# Patient Record
Sex: MALE | Race: ASIAN | NOT HISPANIC OR LATINO | URBAN - METROPOLITAN AREA
[De-identification: names, ages, dates, MRNs, and addresses within clinical notes are randomized per-mention and may not be internally consistent; named-entity substitution may affect disease eponyms.]

---

## 2020-12-20 ENCOUNTER — EMERGENCY (EMERGENCY)
Facility: HOSPITAL | Age: 60
LOS: 1 days | Discharge: HOME | End: 2020-12-20
Attending: EMERGENCY MEDICINE
Payer: MEDICAID

## 2020-12-20 VITALS
OXYGEN SATURATION: 97 % | WEIGHT: 169.76 LBS | HEART RATE: 99 BPM | SYSTOLIC BLOOD PRESSURE: 152 MMHG | RESPIRATION RATE: 18 BRPM | TEMPERATURE: 99 F | DIASTOLIC BLOOD PRESSURE: 94 MMHG | HEIGHT: 72 IN

## 2020-12-20 VITALS
OXYGEN SATURATION: 96 % | RESPIRATION RATE: 18 BRPM | HEART RATE: 56 BPM | DIASTOLIC BLOOD PRESSURE: 90 MMHG | SYSTOLIC BLOOD PRESSURE: 122 MMHG

## 2020-12-20 LAB
ALBUMIN SERPL ELPH-MCNC: 4.2 G/DL — SIGNIFICANT CHANGE UP (ref 3.3–5)
ALP SERPL-CCNC: 80 U/L — SIGNIFICANT CHANGE UP (ref 40–120)
ALT FLD-CCNC: 30 U/L — SIGNIFICANT CHANGE UP (ref 10–45)
ANION GAP SERPL CALC-SCNC: 10 MMOL/L — SIGNIFICANT CHANGE UP (ref 5–17)
ANION GAP SERPL CALC-SCNC: 14 MMOL/L — SIGNIFICANT CHANGE UP (ref 5–17)
APPEARANCE UR: CLEAR — SIGNIFICANT CHANGE UP
AST SERPL-CCNC: 35 U/L — SIGNIFICANT CHANGE UP (ref 10–40)
BASE EXCESS BLDV CALC-SCNC: -0.6 MMOL/L — SIGNIFICANT CHANGE UP (ref -2–2)
BILIRUB SERPL-MCNC: 0.2 MG/DL — SIGNIFICANT CHANGE UP (ref 0.2–1.2)
BILIRUB UR-MCNC: NEGATIVE — SIGNIFICANT CHANGE UP
BUN SERPL-MCNC: 15 MG/DL — SIGNIFICANT CHANGE UP (ref 7–23)
BUN SERPL-MCNC: 16 MG/DL — SIGNIFICANT CHANGE UP (ref 7–23)
CA-I SERPL-SCNC: 1.2 MMOL/L — SIGNIFICANT CHANGE UP (ref 1.12–1.3)
CALCIUM SERPL-MCNC: 9.1 MG/DL — SIGNIFICANT CHANGE UP (ref 8.4–10.5)
CALCIUM SERPL-MCNC: 9.8 MG/DL — SIGNIFICANT CHANGE UP (ref 8.4–10.5)
CHLORIDE BLDV-SCNC: 112 MMOL/L — HIGH (ref 96–108)
CHLORIDE SERPL-SCNC: 105 MMOL/L — SIGNIFICANT CHANGE UP (ref 96–108)
CHLORIDE SERPL-SCNC: 108 MMOL/L — SIGNIFICANT CHANGE UP (ref 96–108)
CK SERPL-CCNC: 157 U/L — SIGNIFICANT CHANGE UP (ref 30–200)
CO2 BLDV-SCNC: 26 MMOL/L — SIGNIFICANT CHANGE UP (ref 22–30)
CO2 SERPL-SCNC: 18 MMOL/L — LOW (ref 22–31)
CO2 SERPL-SCNC: 22 MMOL/L — SIGNIFICANT CHANGE UP (ref 22–31)
COLOR SPEC: SIGNIFICANT CHANGE UP
CREAT SERPL-MCNC: 0.75 MG/DL — SIGNIFICANT CHANGE UP (ref 0.5–1.3)
CREAT SERPL-MCNC: 0.77 MG/DL — SIGNIFICANT CHANGE UP (ref 0.5–1.3)
CRP SERPL-MCNC: 0.12 MG/DL — SIGNIFICANT CHANGE UP (ref 0–0.4)
DIFF PNL FLD: NEGATIVE — SIGNIFICANT CHANGE UP
GAS PNL BLDV: 136 MMOL/L — SIGNIFICANT CHANGE UP (ref 135–145)
GAS PNL BLDV: SIGNIFICANT CHANGE UP
GAS PNL BLDV: SIGNIFICANT CHANGE UP
GLUCOSE BLDV-MCNC: 94 MG/DL — SIGNIFICANT CHANGE UP (ref 70–99)
GLUCOSE SERPL-MCNC: 91 MG/DL — SIGNIFICANT CHANGE UP (ref 70–99)
GLUCOSE SERPL-MCNC: 94 MG/DL — SIGNIFICANT CHANGE UP (ref 70–99)
GLUCOSE UR QL: NEGATIVE — SIGNIFICANT CHANGE UP
HCO3 BLDV-SCNC: 24 MMOL/L — SIGNIFICANT CHANGE UP (ref 21–29)
HCT VFR BLD CALC: 42.5 % — SIGNIFICANT CHANGE UP (ref 39–50)
HCT VFR BLDA CALC: 42 % — SIGNIFICANT CHANGE UP (ref 39–50)
HGB BLD CALC-MCNC: 13.7 G/DL — SIGNIFICANT CHANGE UP (ref 13–17)
HGB BLD-MCNC: 13.8 G/DL — SIGNIFICANT CHANGE UP (ref 13–17)
KETONES UR-MCNC: NEGATIVE — SIGNIFICANT CHANGE UP
LACTATE BLDV-MCNC: 0.9 MMOL/L — SIGNIFICANT CHANGE UP (ref 0.7–2)
LEUKOCYTE ESTERASE UR-ACNC: NEGATIVE — SIGNIFICANT CHANGE UP
MCHC RBC-ENTMCNC: 30.2 PG — SIGNIFICANT CHANGE UP (ref 27–34)
MCHC RBC-ENTMCNC: 32.5 GM/DL — SIGNIFICANT CHANGE UP (ref 32–36)
MCV RBC AUTO: 93 FL — SIGNIFICANT CHANGE UP (ref 80–100)
NITRITE UR-MCNC: NEGATIVE — SIGNIFICANT CHANGE UP
NRBC # BLD: 0 /100 WBCS — SIGNIFICANT CHANGE UP (ref 0–0)
OTHER CELLS CSF MANUAL: 13 ML/DL — LOW (ref 18–22)
PCO2 BLDV: 43 MMHG — SIGNIFICANT CHANGE UP (ref 35–50)
PH BLDV: 7.37 — SIGNIFICANT CHANGE UP (ref 7.35–7.45)
PH UR: 6 — SIGNIFICANT CHANGE UP (ref 5–8)
PLATELET # BLD AUTO: 212 K/UL — SIGNIFICANT CHANGE UP (ref 150–400)
PO2 BLDV: 40 MMHG — SIGNIFICANT CHANGE UP (ref 25–45)
POTASSIUM BLDV-SCNC: 4.1 MMOL/L — SIGNIFICANT CHANGE UP (ref 3.5–5.3)
POTASSIUM SERPL-MCNC: 4.1 MMOL/L — SIGNIFICANT CHANGE UP (ref 3.5–5.3)
POTASSIUM SERPL-MCNC: 4.8 MMOL/L — SIGNIFICANT CHANGE UP (ref 3.5–5.3)
POTASSIUM SERPL-SCNC: 4.1 MMOL/L — SIGNIFICANT CHANGE UP (ref 3.5–5.3)
POTASSIUM SERPL-SCNC: 4.8 MMOL/L — SIGNIFICANT CHANGE UP (ref 3.5–5.3)
PROT SERPL-MCNC: 7.2 G/DL — SIGNIFICANT CHANGE UP (ref 6–8.3)
PROT UR-MCNC: NEGATIVE — SIGNIFICANT CHANGE UP
RBC # BLD: 4.57 M/UL — SIGNIFICANT CHANGE UP (ref 4.2–5.8)
RBC # FLD: 14 % — SIGNIFICANT CHANGE UP (ref 10.3–14.5)
SAO2 % BLDV: 70 % — SIGNIFICANT CHANGE UP (ref 67–88)
SODIUM SERPL-SCNC: 137 MMOL/L — SIGNIFICANT CHANGE UP (ref 135–145)
SODIUM SERPL-SCNC: 140 MMOL/L — SIGNIFICANT CHANGE UP (ref 135–145)
SP GR SPEC: 1.02 — SIGNIFICANT CHANGE UP (ref 1.01–1.02)
UROBILINOGEN FLD QL: NEGATIVE — SIGNIFICANT CHANGE UP
WBC # BLD: 5.24 K/UL — SIGNIFICANT CHANGE UP (ref 3.8–10.5)
WBC # FLD AUTO: 5.24 K/UL — SIGNIFICANT CHANGE UP (ref 3.8–10.5)

## 2020-12-20 PROCEDURE — 82947 ASSAY GLUCOSE BLOOD QUANT: CPT

## 2020-12-20 PROCEDURE — 93970 EXTREMITY STUDY: CPT

## 2020-12-20 PROCEDURE — 86140 C-REACTIVE PROTEIN: CPT

## 2020-12-20 PROCEDURE — 85652 RBC SED RATE AUTOMATED: CPT

## 2020-12-20 PROCEDURE — 93970 EXTREMITY STUDY: CPT | Mod: 26

## 2020-12-20 PROCEDURE — 73564 X-RAY EXAM KNEE 4 OR MORE: CPT | Mod: 26,50

## 2020-12-20 PROCEDURE — 80048 BASIC METABOLIC PNL TOTAL CA: CPT

## 2020-12-20 PROCEDURE — 85014 HEMATOCRIT: CPT

## 2020-12-20 PROCEDURE — 99284 EMERGENCY DEPT VISIT MOD MDM: CPT

## 2020-12-20 PROCEDURE — 82330 ASSAY OF CALCIUM: CPT

## 2020-12-20 PROCEDURE — 96374 THER/PROPH/DIAG INJ IV PUSH: CPT

## 2020-12-20 PROCEDURE — 84295 ASSAY OF SERUM SODIUM: CPT

## 2020-12-20 PROCEDURE — 82803 BLOOD GASES ANY COMBINATION: CPT

## 2020-12-20 PROCEDURE — 83605 ASSAY OF LACTIC ACID: CPT

## 2020-12-20 PROCEDURE — 80053 COMPREHEN METABOLIC PANEL: CPT

## 2020-12-20 PROCEDURE — 85018 HEMOGLOBIN: CPT

## 2020-12-20 PROCEDURE — 84132 ASSAY OF SERUM POTASSIUM: CPT

## 2020-12-20 PROCEDURE — 82435 ASSAY OF BLOOD CHLORIDE: CPT

## 2020-12-20 PROCEDURE — 81003 URINALYSIS AUTO W/O SCOPE: CPT

## 2020-12-20 PROCEDURE — 82550 ASSAY OF CK (CPK): CPT

## 2020-12-20 PROCEDURE — 73564 X-RAY EXAM KNEE 4 OR MORE: CPT

## 2020-12-20 PROCEDURE — 99284 EMERGENCY DEPT VISIT MOD MDM: CPT | Mod: 25

## 2020-12-20 PROCEDURE — 85027 COMPLETE CBC AUTOMATED: CPT

## 2020-12-20 RX ORDER — SODIUM CHLORIDE 9 MG/ML
500 INJECTION INTRAMUSCULAR; INTRAVENOUS; SUBCUTANEOUS ONCE
Refills: 0 | Status: COMPLETED | OUTPATIENT
Start: 2020-12-20 | End: 2020-12-20

## 2020-12-20 RX ORDER — KETOROLAC TROMETHAMINE 30 MG/ML
15 SYRINGE (ML) INJECTION ONCE
Refills: 0 | Status: DISCONTINUED | OUTPATIENT
Start: 2020-12-20 | End: 2020-12-20

## 2020-12-20 RX ADMIN — Medication 15 MILLIGRAM(S): at 18:28

## 2020-12-20 RX ADMIN — SODIUM CHLORIDE 500 MILLILITER(S): 9 INJECTION INTRAMUSCULAR; INTRAVENOUS; SUBCUTANEOUS at 16:37

## 2020-12-20 RX ADMIN — Medication 15 MILLIGRAM(S): at 16:36

## 2020-12-20 NOTE — ED PROVIDER NOTE - NSFOLLOWUPCLINICS_GEN_ALL_ED_FT
[facility] Sports Medicine  Sports Medicine  [address] [place] [address]  Phone: [phone]  Fax:   Follow Up Time:

## 2020-12-20 NOTE — ED PROVIDER NOTE - PATIENT PORTAL LINK FT
You can access the FollowMyHealth Patient Portal offered by [facility] by registering at the following website: [email or web address]. By joining [facility]’s FollowMyHealth portal, you will also be able to view your health information using other applications (apps) compatible with our system.

## 2020-12-20 NOTE — ED PROVIDER NOTE - PROGRESS NOTE DETAILS
Lola Almanzar PGY-1 pain has improved. pt able to ambulate with a limp. favoring left side. will reassess after tylenol Lola Almanzar PGY-1 pt able to ambulate. given cane as support. will provide sports med clinic for follow up

## 2020-12-20 NOTE — ED PROVIDER NOTE - NSFOLLOWUPINSTRUCTIONS_ED_ALL_ED_FT
Muscle Strain    WHAT YOU NEED TO KNOW:    What is a muscle strain? A muscle strain is a twist, pull, or tear of a muscle or tendon. A tendon is a strong elastic tissue that connects a muscle to a bone.    What causes a muscle strain? Stretching a muscle too much may cause a muscle strain. A strain may also happen when a muscle is used too much without rest. Leg muscle strains are more common in people who play sports, run, dance, and water-ski. Strains in the muscles of the abdomen may happen when you play volleyball, tennis, golf, or baseball and when you dive. Low back muscle strains may occur when you lift heavy objects or when you wrestle or do gymnastics.    What are the types of muscle strains?   •A mild strain is also called a first-degree strain. It is a tear of a few muscle fibers with little swelling. You may have very little or no loss of muscle strength.      •A moderate strain is also called a second-degree strain. There is more damage to your muscle or tendon, and it is weaker than it was before the injury.       •A severe strain is also called a third-degree strain. This tear goes along the whole length of the muscle, and you are unable to use the muscle at all.       What increases my risk of a muscle strain?   •Older age      •Muscle fatigue (tiredness)      •Not warming up before exercise      •Past muscle injury, or going back to your usual activity before your injury has healed      •Stiff, tight, and weak muscles      •Training longer or farther than your usual time or distance       •Problems with your feet, or your legs being different lengths      What are the signs and symptoms of a muscle strain? The signs and symptoms of a muscle strain depend on how badly your muscle is injured. The signs and symptoms may or may not show up right away when the injury happens. You may have one or more of the following:  •Bruised skin on the area of your injured muscle      •Muscle soreness, cramps, or spasms      •Little or stiff muscle movement, or loss of muscle strength      •Swelling in the area of the injury      •Muscle pain that gets worse with activity, or pain that moves or spreads to another body area      •Crepitus (crackling sound or grating feeling) when you move your muscle      How is a muscle strain diagnosed? Your healthcare provider will ask about diseases or injuries you have had in the past. He may touch and press parts of your muscle. He may bend, stretch, or move your joint certain ways. You may also have any of the following tests:  •X-ray: This is a picture of the bones and tissues in your body. X-rays may be done to make sure that you did not break a bone when your muscle strain happened.       •Magnetic resonance imaging: This test is also called MRI. During the MRI, pictures of your muscles are taken. An MRI may be used to check for tears or other muscle injuries. It may also be used to look at your joints, bones, or blood vessels. You may be given dye through your vein before the pictures are taken. This helps your body parts show up better. Tell your healthcare provider if you are allergic to shellfish (lobster, crab, or shrimp). People who are allergic to shellfish may also be allergic to some dyes.       •Computed tomography scan: This is also called CT scan. A x-ray machine uses a computer to take pictures of your arms, legs, back, or abdomen. It is used to check for muscle injuries, broken bones, and damaged blood vessels.      •Ultrasound: An ultrasound uses sound waves to show pictures of your muscles and tissues on a monitor.      What can I do to help a muscle strain heal?   •3 to 7 days after the injury: Use Rest, Ice, Compression, and Elevation (RICE) to help stop bruising and decrease pain and swelling.?Rest: Rest your muscle to allow your injury to heal. When the pain decreases, begin normal, slow movements. For mild and moderate muscle strains, you should rest your muscles for about 2 days. However, if you have a severe muscle strain, you should rest for 10 to 14 days. You may need to use crutches to walk if your muscle strain is in your legs or lower body.       ?Ice: Put an ice pack on the injured area. Put a towel between the ice pack and your skin. Do not put the ice pack directly on your skin. You can use a package of frozen peas instead of an ice pack.      ?Compression: You may need to wrap an elastic bandage around the area to decrease swelling. It should be tight enough for you to feel support. Do not wrap it too tightly.       ?Elevation: Keep the injured muscle raised above your heart if possible. For example, if you have a strain of your lower leg muscle, lie down and prop your leg up on pillows. This helps decrease pain and swelling.      •3 to 21 days after your injury: Start to slowly and regularly exercise your strained muscle. This will help it heal. If you feel pain, decrease how hard you are exercising.       •1 to 6 weeks after your injury: Stretch the injured muscle. Stretch the muscle for about 30 seconds. Do this 4 times a day. You may stretch the muscle until you feel a slight pull. Stop stretching if you feel pain.       •2 weeks to 6 months after your injury: The goal of this phase is to return to the activity you were doing before the injury without hurting the muscle again.       •3 weeks to 6 months after your injury: Keep stretching and strengthening your muscles to avoid injury. Slowly increase the time and distance that you exercise. You may still have signs and symptoms of muscle strain 6 months after the injury, even if you do things to help it heal. In this case, you may need surgery on the muscle.       How is a muscle strain treated?   •Medicines: ?NSAIDs: This medicine is used to decrease pain and inflammation. It can be bought without a doctor's order. NSAIDs can cause stomach bleeding or kidney problems if they are not taken correctly. Always read the medicine label and follow the directions on it before you use this medicine. You should only use this medicine for 3 to 7 days after the injury happened.      ?Muscle relaxers help decrease pain and muscle spasms.      ?Steroid medicines: Your healthcare provider may recommend a steroid injection to decrease pain and inflammation.      ?Local anesthetic: This can be used to numb the are for a short time. This is often used if you have a muscle strain in your back.      •Physical therapy: A physical therapist teaches you exercises to help improve movement and strength, and to decrease pain.       •Surgery: healthcare providers may recommend surgery if your muscle strain does not heal after 6 months of treatments. Surgery may be done to drain blood that has pooled in your muscle. If your tendon was torn off of the bone, it may be put back with surgery.      How can a muscle strain be prevented?   •Always wear proper shoes when you play sports: Replace your old running shoes with new ones often if you are a runner. Use special shoe inserts or arch supports to correct leg or foot problems. Ask your healthcare provider for more information on shoe supports.      •Do warm up and cool down exercises: Do stretching exercises before you work out or do sports activities. These exercises will help loosen and decrease stress on your muscles. Cool down and stretch after your workout. Do not stop and rest after a workout without cooling down first.      •Keep your muscles strong with strength training exercises: Exercises such as weight lifting and stretching exercises help keep your muscles flexible and strong. A physical therapist or  may help you with these exercises.       •Slowly start your exercise or sports training program: Follow your healthcare provider's advice on when to start exercising. Slowly increase time, distance, and how often you train. Sudden increases in how often you train may cause you to injure your muscle again.       When should I call my healthcare provider?   •Your pain and swelling worsen or do not go away.      •You have questions or concerns about your care or treatment.      When should I seek immediate care?   •You suddenly cannot feel or move your injured muscle.          CARE AGREEMENT:    You have the right to help plan your care. Learn about your health condition and how it may be treated. Discuss treatment options with your healthcare providers to decide what care you want to receive. You always have the right to refuse treatment.

## 2020-12-20 NOTE — ED PROVIDER NOTE - OBJECTIVE STATEMENT
Private Physician Sultana Garrett (pcp/[facility])  60y male pmh Neg, No dm,htn,hld,cancer,cva,cad,mi,covid,habits,travel. Pt employed as . Pt comes to ed c/o olivia lower extr pain. onset 2.5 h ago. No hx trauma. Onset while walking olivia lowet thighs to olivia tib/fib. Pain worse with walking. NO prior hx of similar pains. No fever chills. cp. shortness of breath. Pain rt>>left. Has diff walking 2/2 pain. Private Physician Sultana Garrett (pcp/[facility])  60y male pmh Neg, No dm,htn,hld,cancer,cva,cad,mi,covid,habits,travel. Pt employed as . Pt comes to ed c/o olivia lower extr pain. onset 2.5 h ago. No hx trauma. Onset while walking olivia lowet thighs to olivia tib/fib. Pain worse with walking. NO prior hx of similar pains. No fever chills. cp. shortness of breath. Pain rt>>left. Has diff walking 2/2 pain.      Lola Almanzar PGY-1 spoke to patient in native language LET[place]. 59 y/o M no past medical hx p/w b/l leg pain x 1 day. 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 pain began 2 hrs upon waking, b/l thighs to shin. worsens with walking. has trouble ambulating. has taken tylenol with mild relief. pain 6/10 dull.   denies trauma, fall, numbness, swelling, redness, wounds, cp, sob/

## 2020-12-20 NOTE — ED PROVIDER NOTE - MUSCULOSKELETAL MINIMAL EXAM
Jose Juan femoral,post tibial,dp pulses intact, No swelling + mod pain with flexion rt knee>l w cerpitus.

## 2020-12-20 NOTE — ED ADULT NURSE REASSESSMENT NOTE - NS ED NURSE REASSESS COMMENT FT1
Patient being transported to doppler ultrasound  with transport personnel. Patient stable upon leaving floor.

## 2020-12-20 NOTE — ED ADULT NURSE NOTE - OBJECTIVE STATEMENT
61yo M aox4 no pertinent medical history, ambulatory w/ cane and assistance, presents to ED c/o b/l leg pain, as per pt reports about 2.5 hours ago sudden began b/l pain as pe rpt states " I can't  move, bend the legs , walk, steps, stand" denies trauma to the area, upon assessment +pulses, +sensation, + ROM, cold to touch, +2 cap refill. Pt denies CP, SOB, HA, vision changes, n/v/d, fevers chills, abdominal pain, weakness, dizziness, Gu issues Safety and comfort measures initiated- bed placed in lowest position and side rails raised. Pt oriented to call bell system.

## 2020-12-20 NOTE — ED PROVIDER NOTE - CLINICAL SUMMARY MEDICAL DECISION MAKING FREE TEXT BOX
Acute pain rt>left knee/leg. Good pulses. no back pain rad. w crepitus knee rt>left. Check xr, labs, doppler ,analgesia reassess  Nathaniel Diamond MD, Facep Acute pain rt>left knee/leg. Good pulses. no back pain rad. w crepitus knee rt>left. Check xr, labs, doppler ,analgesia reassess  Nathaniel Diamond MD, Facep    sigrid Almanzar PGY-1 spoke to patient in native language LET[place]. 59 y/o M no past medical hx p/w b/l leg pain x 1 day. vitals wnl, on exam pt unable to ambulate 4+ steps due to pain. sensation intact. MS 5/5 b/l. no knee, ankle bony tenderness. no evidence of swelling or erythema. no calf tenderness. likely muscle strain. will treat pain, r/o rhabdo and DVT

## 2021-01-29 NOTE — ED ADULT NURSE NOTE - NS PRO PASSIVE SMOKE EXP
There were incidental findings of your lungs as follows: Subcentimeter pulmonary nodules, as described Based on current Fleischner Society 2017 Guidelines on incidental pulmonary nodule, in this patient who is less than 39years of age, management decisions should be made on a case by case basis, keeping in   mind that infectious causes are more likely than cancer and that use of serial CT should be minimized      These nodules were present in the past - it is imperative that you follow up with your PCP from further testing    Also please follow up with your PCP for your anxiety    I have referred you to a gastroenterologist - please call the office and make an appt - the location and telephone contacts are located in these instructions No

## 2024-03-06 NOTE — ED ADULT NURSE NOTE - SPO2 (%)
89 y/o M here for Acute Toxic Metabolic Encephalopathy, seen at bedside, lethargic, infectious workup, ambulate, PT, continue home meds, remaining plan as per nocturnist.
100